# Patient Record
Sex: FEMALE | Race: WHITE | NOT HISPANIC OR LATINO | Employment: UNEMPLOYED | URBAN - METROPOLITAN AREA
[De-identification: names, ages, dates, MRNs, and addresses within clinical notes are randomized per-mention and may not be internally consistent; named-entity substitution may affect disease eponyms.]

---

## 2018-05-14 ENCOUNTER — HOSPITAL ENCOUNTER (INPATIENT)
Facility: MEDICAL CENTER | Age: 33
LOS: 1 days | End: 2018-05-15
Attending: OBSTETRICS & GYNECOLOGY | Admitting: OBSTETRICS & GYNECOLOGY
Payer: COMMERCIAL

## 2018-05-14 LAB
BASOPHILS # BLD AUTO: 0.6 % (ref 0–1.8)
BASOPHILS # BLD: 0.06 K/UL (ref 0–0.12)
COMMENT 1642: NORMAL
EOSINOPHIL # BLD AUTO: 0.06 K/UL (ref 0–0.51)
EOSINOPHIL NFR BLD: 0.6 % (ref 0–6.9)
ERYTHROCYTE [DISTWIDTH] IN BLOOD BY AUTOMATED COUNT: 41.2 FL (ref 35.9–50)
HCT VFR BLD AUTO: 40.6 % (ref 37–47)
HGB BLD-MCNC: 12.2 G/DL (ref 12–16)
HOLDING TUBE BB 8507: NORMAL
IMM GRANULOCYTES # BLD AUTO: 0.1 K/UL (ref 0–0.11)
IMM GRANULOCYTES NFR BLD AUTO: 0.9 % (ref 0–0.9)
LYMPHOCYTES # BLD AUTO: 1.68 K/UL (ref 1–4.8)
LYMPHOCYTES NFR BLD: 15.9 % (ref 22–41)
MCH RBC QN AUTO: 24.6 PG (ref 27–33)
MCHC RBC AUTO-ENTMCNC: 30 G/DL (ref 33.6–35)
MCV RBC AUTO: 82 FL (ref 81.4–97.8)
MONOCYTES # BLD AUTO: 0.73 K/UL (ref 0–0.85)
MONOCYTES NFR BLD AUTO: 6.9 % (ref 0–13.4)
MORPHOLOGY BLD-IMP: NORMAL
NEUTROPHILS # BLD AUTO: 7.92 K/UL (ref 2–7.15)
NEUTROPHILS NFR BLD: 75.1 % (ref 44–72)
NRBC # BLD AUTO: 0 K/UL
NRBC BLD-RTO: 0 /100 WBC
PLATELET # BLD AUTO: 197 K/UL (ref 164–446)
PMV BLD AUTO: 12.1 FL (ref 9–12.9)
RBC # BLD AUTO: 4.95 M/UL (ref 4.2–5.4)
WBC # BLD AUTO: 10.6 K/UL (ref 4.8–10.8)

## 2018-05-14 PROCEDURE — 85025 COMPLETE CBC W/AUTO DIFF WBC: CPT

## 2018-05-14 PROCEDURE — 700102 HCHG RX REV CODE 250 W/ 637 OVERRIDE(OP): Performed by: OBSTETRICS & GYNECOLOGY

## 2018-05-14 PROCEDURE — 770002 HCHG ROOM/CARE - OB PRIVATE (112)

## 2018-05-14 PROCEDURE — 304965 HCHG RECOVERY SERVICES

## 2018-05-14 PROCEDURE — 36415 COLL VENOUS BLD VENIPUNCTURE: CPT

## 2018-05-14 PROCEDURE — 700111 HCHG RX REV CODE 636 W/ 250 OVERRIDE (IP)

## 2018-05-14 PROCEDURE — 59409 OBSTETRICAL CARE: CPT

## 2018-05-14 PROCEDURE — A9270 NON-COVERED ITEM OR SERVICE: HCPCS | Performed by: OBSTETRICS & GYNECOLOGY

## 2018-05-14 RX ORDER — SODIUM CHLORIDE, SODIUM LACTATE, POTASSIUM CHLORIDE, CALCIUM CHLORIDE 600; 310; 30; 20 MG/100ML; MG/100ML; MG/100ML; MG/100ML
INJECTION, SOLUTION INTRAVENOUS PRN
Status: DISCONTINUED | OUTPATIENT
Start: 2018-05-14 | End: 2018-05-15 | Stop reason: HOSPADM

## 2018-05-14 RX ORDER — SODIUM CHLORIDE, SODIUM LACTATE, POTASSIUM CHLORIDE, CALCIUM CHLORIDE 600; 310; 30; 20 MG/100ML; MG/100ML; MG/100ML; MG/100ML
INJECTION, SOLUTION INTRAVENOUS CONTINUOUS
Status: DISCONTINUED | OUTPATIENT
Start: 2018-05-14 | End: 2018-05-14 | Stop reason: HOSPADM

## 2018-05-14 RX ORDER — METHYLERGONOVINE MALEATE 0.2 MG/ML
0.2 INJECTION INTRAVENOUS
Status: DISCONTINUED | OUTPATIENT
Start: 2018-05-14 | End: 2018-05-14 | Stop reason: HOSPADM

## 2018-05-14 RX ORDER — IBUPROFEN 600 MG/1
600 TABLET ORAL EVERY 6 HOURS PRN
Status: DISCONTINUED | OUTPATIENT
Start: 2018-05-14 | End: 2018-05-15 | Stop reason: HOSPADM

## 2018-05-14 RX ORDER — CARBOPROST TROMETHAMINE 250 UG/ML
250 INJECTION, SOLUTION INTRAMUSCULAR
Status: DISCONTINUED | OUTPATIENT
Start: 2018-05-14 | End: 2018-05-15 | Stop reason: HOSPADM

## 2018-05-14 RX ORDER — TERBUTALINE SULFATE 1 MG/ML
0.25 INJECTION, SOLUTION SUBCUTANEOUS PRN
Status: DISCONTINUED | OUTPATIENT
Start: 2018-05-14 | End: 2018-05-14 | Stop reason: HOSPADM

## 2018-05-14 RX ORDER — METHYLERGONOVINE MALEATE 0.2 MG/ML
0.2 INJECTION INTRAVENOUS
Status: DISCONTINUED | OUTPATIENT
Start: 2018-05-14 | End: 2018-05-15 | Stop reason: HOSPADM

## 2018-05-14 RX ORDER — ONDANSETRON 4 MG/1
4 TABLET, ORALLY DISINTEGRATING ORAL EVERY 6 HOURS PRN
Status: DISCONTINUED | OUTPATIENT
Start: 2018-05-14 | End: 2018-05-15 | Stop reason: HOSPADM

## 2018-05-14 RX ORDER — ALUMINA, MAGNESIA, AND SIMETHICONE 2400; 2400; 240 MG/30ML; MG/30ML; MG/30ML
30 SUSPENSION ORAL EVERY 6 HOURS PRN
Status: DISCONTINUED | OUTPATIENT
Start: 2018-05-14 | End: 2018-05-14 | Stop reason: HOSPADM

## 2018-05-14 RX ORDER — IBUPROFEN 600 MG/1
600 TABLET ORAL EVERY 6 HOURS PRN
Status: DISCONTINUED | OUTPATIENT
Start: 2018-05-14 | End: 2018-05-14

## 2018-05-14 RX ORDER — CARBOPROST TROMETHAMINE 250 UG/ML
250 INJECTION, SOLUTION INTRAMUSCULAR
Status: DISCONTINUED | OUTPATIENT
Start: 2018-05-14 | End: 2018-05-14 | Stop reason: HOSPADM

## 2018-05-14 RX ORDER — MISOPROSTOL 200 UG/1
800 TABLET ORAL
Status: DISCONTINUED | OUTPATIENT
Start: 2018-05-14 | End: 2018-05-15 | Stop reason: HOSPADM

## 2018-05-14 RX ORDER — OXYCODONE HYDROCHLORIDE AND ACETAMINOPHEN 5; 325 MG/1; MG/1
1 TABLET ORAL EVERY 4 HOURS PRN
Status: DISCONTINUED | OUTPATIENT
Start: 2018-05-14 | End: 2018-05-14

## 2018-05-14 RX ORDER — MISOPROSTOL 200 UG/1
800 TABLET ORAL
Status: DISCONTINUED | OUTPATIENT
Start: 2018-05-14 | End: 2018-05-14 | Stop reason: HOSPADM

## 2018-05-14 RX ORDER — OXYCODONE AND ACETAMINOPHEN 10; 325 MG/1; MG/1
1 TABLET ORAL EVERY 4 HOURS PRN
Status: DISCONTINUED | OUTPATIENT
Start: 2018-05-14 | End: 2018-05-15 | Stop reason: HOSPADM

## 2018-05-14 RX ORDER — ONDANSETRON 2 MG/ML
4 INJECTION INTRAMUSCULAR; INTRAVENOUS EVERY 6 HOURS PRN
Status: DISCONTINUED | OUTPATIENT
Start: 2018-05-14 | End: 2018-05-15 | Stop reason: HOSPADM

## 2018-05-14 RX ORDER — DOCUSATE SODIUM 100 MG/1
100 CAPSULE, LIQUID FILLED ORAL 2 TIMES DAILY PRN
Status: DISCONTINUED | OUTPATIENT
Start: 2018-05-14 | End: 2018-05-15 | Stop reason: HOSPADM

## 2018-05-14 RX ORDER — HYDROCODONE BITARTRATE AND ACETAMINOPHEN 5; 325 MG/1; MG/1
1-2 TABLET ORAL EVERY 6 HOURS PRN
Status: DISCONTINUED | OUTPATIENT
Start: 2018-05-14 | End: 2018-05-15 | Stop reason: HOSPADM

## 2018-05-14 RX ADMIN — IBUPROFEN 600 MG: 600 TABLET, FILM COATED ORAL at 22:44

## 2018-05-14 RX ADMIN — IBUPROFEN 600 MG: 600 TABLET, FILM COATED ORAL at 15:20

## 2018-05-14 RX ADMIN — IBUPROFEN 600 MG: 600 TABLET, FILM COATED ORAL at 08:20

## 2018-05-14 RX ADMIN — Medication 20 UNITS: at 09:00

## 2018-05-14 ASSESSMENT — PAIN SCALES - GENERAL
PAINLEVEL_OUTOF10: 3
PAINLEVEL_OUTOF10: 1
PAINLEVEL_OUTOF10: 1
PAINLEVEL_OUTOF10: 3
PAINLEVEL_OUTOF10: 1
PAINLEVEL_OUTOF10: 3

## 2018-05-14 ASSESSMENT — PATIENT HEALTH QUESTIONNAIRE - PHQ9
SUM OF ALL RESPONSES TO PHQ9 QUESTIONS 1 AND 2: 0
SUM OF ALL RESPONSES TO PHQ9 QUESTIONS 1 AND 2: 0
1. LITTLE INTEREST OR PLEASURE IN DOING THINGS: NOT AT ALL
1. LITTLE INTEREST OR PLEASURE IN DOING THINGS: NOT AT ALL
2. FEELING DOWN, DEPRESSED, IRRITABLE, OR HOPELESS: NOT AT ALL
2. FEELING DOWN, DEPRESSED, IRRITABLE, OR HOPELESS: NOT AT ALL

## 2018-05-14 ASSESSMENT — LIFESTYLE VARIABLES
EVER_SMOKED: NEVER
ALCOHOL_USE: NO

## 2018-05-14 NOTE — H&P
DATE OF ADMISSION:  2018    ADMITTING DIAGNOSES:  1.  Intrauterine pregnancy at 39-4/7 weeks.  2.  Spontaneous rupture of membranes.  3.  Active labor.    HISTORY OF PRESENT ILLNESS:  This is a 32-year-old  4, para 3 with an   estimated date of confinement of 2018 established by last menstrual   period consistent with a 13-week ultrasound who presents to labor and delivery   with complaints of spontaneous rupture of membranes this morning for clear   fluid.  She underwent a sterile speculum exam, which was positive x3.  She is   4 cm dilated and vivian every 2 minutes.  Recommendation is made for   admission.  The patient agrees and declines anything for pain management at   this time.    PRENATAL CARE:  Has been with Dr. Alvarez.  Her estimated date of   confinement of 2018 was established by last menstrual period consistent   with a 13-week ultrasound.    PRENATAL LABS:  Her blood type is O positive, antibody screen negative, RPR   nonreactive, rubella immune, hepatitis B surface antigen negative, hepatitis C   negative, HIV negative.  Her 1-hour Glucola was normal.  Her Group B strep   status is negative.  She requested only MSAFP, which was low risk.    COMPLICATIONS WITH THE PREGNANCY:  Include anemia.  Total weight gain of   approximately 19 pounds.  She has an anterior placenta with no previa.    PAST MEDICAL HISTORY:  ALLERGIES:  She has no known drug allergies.    MEDICATIONS:  Include prenatal vitamins and iron.    PAST MEDICAL HISTORY:  Negative.    PAST SURGICAL HISTORY:  Negative.    SOCIAL HISTORY:  She denies tobacco, alcohol or IV drug use.    GYNECOLOGIC HISTORY:  She denies any abnormal Paps or HSV.    OBSTETRICAL HISTORY:  She has had 3 normal spontaneous vaginal deliveries, the   largest of which was 8 pounds.    PHYSICAL EXAMINATION:  VITAL SIGNS:  Temperature is 98.1, blood pressure 122/77, pulse is 96.  GENERAL:  She is in moderate distress with  contractions.  LUNGS:  Clear to auscultation bilaterally.  CARDIOVASCULAR:  Regular rate and rhythm.  ABDOMEN:  Gravid and nontender.  EXTREMITIES:  Show +1 pedal edema.  She has no cords or Homans sign.  PELVIC:  Fetal heart tones are in the 130s to 140s and a category 1 tracing.    Tocometry shows contractions every 2-3 minutes.  Cervical exam per the nurse,   she is 4 cm dilated, 70% effaced, -2 station, vertex presentation.  Estimated   fetal weight is 3500 g.    IMPRESSION:  This is a 32-year-old  4, para 3 at 39-4/7 weeks with   spontaneous rupture of membranes in active labor, is doing well.    PLAN:  1.  Patient will be admitted to labor and delivery.  2.  IV fluids will be started.  3.  There is currently reassuring fetal surveillance.       ____________________________________     MD JOSÉ MIGUEL MIDDLETON / IFRAH    DD:  2018 05:24:27  DT:  2018 05:41:18    D#:  [id number]  Job#:  [id number]    cc: MEHDI KEARNEY MD

## 2018-05-14 NOTE — PROGRESS NOTES
Patient brought from labor and delivery via wheelchair.  Patient oriented to room and surroundings. Id bands and security issues discussed. Including not letting anyone take infant without pink photo id. No sleeping with infant, no carrying infant in halls, and no leaving infant unattended. 0-10 pain scale and pain management discussed. Fundus firm with light lochia. IV infusing without redness or swelling.  Family at bedside.  Patient encouraged to call before getting out of bed until stable.  Patient encouraged to call for any needs. Skylight discussed. Cuddles activated. ID bands checked

## 2018-05-14 NOTE — PROGRESS NOTES
32 y.o. , EDC  (39.4)    Pt presents to L&D c/o UCs since about 0100 that are 3-5 min apart and SROM around 0440. Pt is grossly ruptured w/ clear fluid. SVE=4/-2.   0515-Dr Reyna updated. Order to admit pt. Pt wanting to labor naturally, ok with saline lock.   0615-SVE by MD=8cm.   0655-report given to STEPAN Philippe

## 2018-05-14 NOTE — PROGRESS NOTES
0655; Report received from Wanda AGUILAR. Pt called out to say needs to push; Dr. Reyna called; SVE 9 cm  0700: Dr. Iraheta present for  of viable male at 0704; 8/9 APGAR. Mother refusing pitocin and bleeding is light and fundus firm  0815; Assisted to bathroom to void; bleeding is light to moderate with no clots; educated about the importance of voiding frequently.   0900: Transferred to [facility] via w/c. Pt had blood running down her leg so assisted to bathroom to void again; fundal rub produced golf-ball sized clot with light bleeding following clot. Encouraged to allow us to start pitocin and educated her why; agreeing to pitocin; pitocin started. Report given to Clyde AGUILAR

## 2018-05-14 NOTE — PROGRESS NOTES
S:  Pt having contractions closer and stronger.  REquesting to be examined    O:  VSS - afebrile  FHTs 140s Cat 1  [place]: Q 2  Cx: 8/100/0    A/P:  IUP at 39 4/7 weeks, SROM active labor - doing well  1. Labor: Progressing well  2.  FWB: Reassuirng

## 2018-05-14 NOTE — DELIVERY NOTE
DATE OF SERVICE:  2018    DELIVERY NOTE:  This is a 32-year-old  4, para 3 who presented to labor   and delivery this morning with complaints of spontaneous rupture of membranes   for clear fluid at 0445 hours.  Sterile speculum exam was positive x3.  She   was 4 cm dilated and vivian.  She was admitted to labor and delivery and   quickly progressed to complete.  She pushed 3 times to deliver the head over   an intact perineum.  Mouth and nose were bulb suctioned and there was no   evidence of nuchal cord.  The rest of the baby delivered easily from the   occiput anterior position at 0704 hours.  This is a viable male infant, weight   pending, Apgars 8 and 9.  The infant was placed on the mother's abdomen   crying vigorously.  There was delayed cord clamping for 1 minute.  The cord   was then clamped twice and cut.  The placenta delivered spontaneously intact   with a 3-vessel cord at 0707 hours.  Fundal massage and bimanual massage   provided good uterine contraction.  Estimated blood loss was 100 mL.    Inspection of the cervix revealed no lacerations.  Inspection of the perineum   revealed no lacerations.  Mother and infant are stable.       ____________________________________     MD JOSÉ MIGUEL MIDDLETON / IFRAH    DD:  2018 07:17:10  DT:  2018 07:26:31    D#:  [id number]  Job#:  [id number]    cc: MEHDI KEARNEY MD

## 2018-05-15 VITALS
RESPIRATION RATE: 20 BRPM | OXYGEN SATURATION: 97 % | TEMPERATURE: 98 F | BODY MASS INDEX: 25.9 KG/M2 | HEART RATE: 104 BPM | HEIGHT: 67 IN | SYSTOLIC BLOOD PRESSURE: 112 MMHG | WEIGHT: 165 LBS | DIASTOLIC BLOOD PRESSURE: 65 MMHG

## 2018-05-15 LAB
ERYTHROCYTE [DISTWIDTH] IN BLOOD BY AUTOMATED COUNT: 39.4 FL (ref 35.9–50)
HCT VFR BLD AUTO: 31.9 % (ref 37–47)
HGB BLD-MCNC: 10.1 G/DL (ref 12–16)
MCH RBC QN AUTO: 25 PG (ref 27–33)
MCHC RBC AUTO-ENTMCNC: 31.7 G/DL (ref 33.6–35)
MCV RBC AUTO: 79 FL (ref 81.4–97.8)
PLATELET # BLD AUTO: 278 K/UL (ref 164–446)
PMV BLD AUTO: 10.3 FL (ref 9–12.9)
RBC # BLD AUTO: 4.04 M/UL (ref 4.2–5.4)
WBC # BLD AUTO: 13.5 K/UL (ref 4.8–10.8)

## 2018-05-15 PROCEDURE — 36415 COLL VENOUS BLD VENIPUNCTURE: CPT

## 2018-05-15 PROCEDURE — 700102 HCHG RX REV CODE 250 W/ 637 OVERRIDE(OP): Performed by: OBSTETRICS & GYNECOLOGY

## 2018-05-15 PROCEDURE — A9270 NON-COVERED ITEM OR SERVICE: HCPCS | Performed by: OBSTETRICS & GYNECOLOGY

## 2018-05-15 PROCEDURE — 85027 COMPLETE CBC AUTOMATED: CPT

## 2018-05-15 RX ORDER — PSEUDOEPHEDRINE HCL 30 MG
100 TABLET ORAL 2 TIMES DAILY PRN
Qty: 60 CAP | COMMUNITY
Start: 2018-05-15

## 2018-05-15 RX ORDER — IBUPROFEN 600 MG/1
600 TABLET ORAL EVERY 6 HOURS PRN
Qty: 20 TAB | Refills: 1 | Status: SHIPPED | OUTPATIENT
Start: 2018-05-15

## 2018-05-15 RX ADMIN — IBUPROFEN 600 MG: 600 TABLET, FILM COATED ORAL at 05:20

## 2018-05-15 RX ADMIN — IBUPROFEN 600 MG: 600 TABLET, FILM COATED ORAL at 12:28

## 2018-05-15 ASSESSMENT — PATIENT HEALTH QUESTIONNAIRE - PHQ9
SUM OF ALL RESPONSES TO PHQ9 QUESTIONS 1 AND 2: 0
2. FEELING DOWN, DEPRESSED, IRRITABLE, OR HOPELESS: NOT AT ALL
1. LITTLE INTEREST OR PLEASURE IN DOING THINGS: NOT AT ALL

## 2018-05-15 ASSESSMENT — PAIN SCALES - GENERAL
PAINLEVEL_OUTOF10: 4
PAINLEVEL_OUTOF10: 2
PAINLEVEL_OUTOF10: 3
PAINLEVEL_OUTOF10: 2

## 2018-05-15 NOTE — PROGRESS NOTES
A&Ox4, pleasant. Denies pain. Fundus firm, lochia light. Breastfeeding going well. No issues per pt. All needs met at this time per pt. VSS. Up self, tolerates well.

## 2018-05-15 NOTE — PROGRESS NOTES
Assessment complete. VSS- Fundus firm, lochia light. Patient ambulating and voiding without difficulty. Pt requesting IV to be removed. Educated pt on importance of repeat CBC resulting before removal, but pt would like IV removed now. IV removed, tip intact. POC discussed at bedside. Feeding plan discussed; helped infant to latch and pt will call for next feed. Patient would like to call if she would like pain medications throughout the night. Call light within reach.

## 2018-05-15 NOTE — PROGRESS NOTES
POSTPARTUM DAY 1    Afeb, VS nl    No complaints, wants to go home, instructions given.    LAB:     Results for HARLEY ANGUIANO (MRN [id number]) as of 5/15/2018 06:30   5/14/2018 05:27 5/15/2018 03:43   WBC 10.6 13.5 (H)   Hemoglobin 12.2 10.1 (L)   Hematocrit 40.6 31.9 (L)   Platelet Count 197 278       PE:  Ut NT, calves NT    PLAN:  Discharge  home, followup 6 weeks, Rx PNV, ibuprofen 600 mg PRN.

## 2018-05-15 NOTE — DISCHARGE INSTRUCTIONS
POSTPARTUM DISCHARGE INSTRUCTIONS FOR MOM    YOB: 1985   Age: 32 y.o.               Admit Date: 5/14/2018     Discharge Date: 5/15/2018  Attending Doctor:  Joseph Alvarez M.D.                  Allergies:  Patient has no known allergies.    Discharged to home by car. Discharged via wheelchair, hospital escort: Yes.  Special equipment needed: Not Applicable  Belongings with: Personal  Be sure to schedule a follow-up appointment with your primary care doctor or any specialists as instructed.     Discharge Plan:   Diet Plan: Discussed  Activity Level: Discussed  Confirmed Follow up Appointment: Patient to Call and Schedule Appointment  Confirmed Symptoms Management: Discussed  Medication Reconciliation Updated: Yes  Influenza Vaccine Indication: Patient Refuses    REASONS TO CALL YOUR OBSTETRICIAN:  1.   Persistent fever or shaking chills (Temperature higher than 100.4)  2.   Heavy bleeding (soaking more than 1 pad per hour); Passing clots  3.   Foul odor from vagina  4.   Mastitis (Breast infection; breast pain, chills, fever, redness)  5.   Urinary pain, burning or frequency  6.   Episiotomy infection  7.   Severe depression longer than 24 hours    HAND WASHING  · Prior to handling the baby.  · Before breastfeeding or bottle feeding baby.  · After using the bathroom or changing the baby's diaper.    VAGINAL CARE  · Nothing inside vagina for 6 weeks: no sexual intercourse, tampons or douching.  · Bleeding may continue for 2-4 weeks.  Amount may vary.    · Call your physician for heavy bleeding which means soaking more than 1 pad per hour    BIRTH CONTROL  · It is possible to become pregnant at any time after delivery and while breastfeeding.  · Plan to discuss a method of birth control with your physician at your follow up visit. visit.    DIET AND ELIMINATION  · Eating more fiber (bran cereal, fruits, and vegetables) and drinking plenty of fluids will help to avoid constipation.  · Urinary frequency  "after childbirth is normal.    POSTPARTUM BLUES  During the first few days after birth, you may experience a sense of the \"blues\" which may include impatience, irritability or even crying.  These feeling come and go quickly.  However, as many as 1 in 10 women experience emotional symptoms known as postpartum depression.    Postpartum depression:  May start as early as the second or third day after delivery or take several weeks or months to develop.  Symptoms of \"blues\" are present, but are more intense:  Crying spells; loss of appetite; feelings of hopelessness or loss of control; fear of touching the baby; over concern or no concern at all about the baby; little or no concern about your own appearance/caring for yourself; and/or inability to sleep or excessive sleeping.  Contact your physician if you are experiencing any of these symptoms.    Crisis Hotline:  · [place] Crisis Hotline:  6-307-GCJOHVC  Or 1-339.250.8607  · [place] Crisis Hotline:  1-288.504.9726  Or 137-194-6838    PREVENTING SHAKEN BABY:  If you are angry or stressed, PUT THE BABY IN THE CRIB, step away, take some deep breaths, and wait until you are calm to care for the baby.  DO NOT SHAKE THE BABY.  You are not alone, call a supporter for help.    · Crisis Call Center 24/7 crisis line 805-247-4242 or 1-455.972.4874  · You can also text them, text \"ANSWER\" to 351801    QUIT SMOKING/TOBACCO USE:  I understand the use of any tobacco products increases my chance of suffering from future heart disease and could cause other illnesses which may shorten my life. Quitting the use of tobacco products is the single most important thing I can do to improve my health. For further information on smoking / tobacco cessation call a Toll Free Quit Line at 1-246.257.6696 (*[employer] [place]) or 1-890.656.1211 (American Lung Association) or you can access the web based program at www.lungusa.org.    · [place] Tobacco Users Help Line:  (351) 464-3305       " Toll Free: 0-163-394-2128  · Quit Tobacco Program [facility] (935)398-8881    DEPRESSION / SUICIDE RISK:  As you are discharged from this [facility], it is important to learn how to keep safe from harming yourself.    Recognize the warning signs:  · Abrupt changes in personality, positive or negative- including increase in energy   · Giving away possessions  · Change in eating patterns- significant weight changes-  positive or negative  · Change in sleeping patterns- unable to sleep or sleeping all the time   · Unwillingness or inability to communicate  · Depression  · Unusual sadness, discouragement and loneliness  · Talk of wanting to die  · Neglect of personal appearance   · Rebelliousness- reckless behavior  · Withdrawal from people/activities they love  · Confusion- inability to concentrate     If you or a loved one observes any of these behaviors or has concerns about self-harm, here's what you can do:  · Talk about it- your feelings and reasons for harming yourself  · Remove any means that you might use to hurt yourself (examples: pills, rope, extension cords, firearm)  · Get professional help from the community (Mental Health, Substance Abuse, psychological counseling)  · Do not be alone:Call your Safe Contact- someone whom you trust who will be there for you.  · Call your local CRISIS HOTLINE 275-9307 or 431-272-8001  · Call your local Children's Mobile Crisis Response Team [place] (614) 044-5454 or www.[employer]  · Call the toll free National Suicide Prevention Hotlines   · National Suicide Prevention Lifeline 588-565-CSRJ (8102)  · National Hope Line Network 800-SUICIDE (802-9760)    DISCHARGE SURVEY:  Thank you for choosing [facility].  We hope we provided you with very good care.  You may be receiving a survey in the mail.  Please fill it out.  Your opinion is valuable to us.    ADDITIONAL EDUCATIONAL MATERIALS GIVEN TO PATIENT:        My signature on  this form indicates that:  1.  I have reviewed and understand the above information  2.  My questions regarding this information have been answered to my satisfaction.  3.  I have formulated a plan with my discharge nurse to obtain my prescribed medication for home.

## 2018-05-15 NOTE — PROGRESS NOTES
Discharge order received. No PIV  Printed discharge instructions and prescriptions given to pt. All discharge education complete, specifically need to f/u with OBGYN in 6 wks and come back through ED for s/s of infection/hemmorhage, all questions and concerns were addressed. VSS. Labs stable. Staff transported pt down to private vehicle.

## 2019-02-22 ENCOUNTER — HOSPITAL ENCOUNTER (OUTPATIENT)
Dept: LAB | Facility: MEDICAL CENTER | Age: 34
End: 2019-02-22
Attending: PHYSICIAN ASSISTANT
Payer: COMMERCIAL

## 2019-02-22 PROCEDURE — 87591 N.GONORRHOEAE DNA AMP PROB: CPT

## 2019-02-22 PROCEDURE — 87491 CHLMYD TRACH DNA AMP PROBE: CPT

## 2019-02-23 LAB
C TRACH DNA SPEC QL NAA+PROBE: NEGATIVE
N GONORRHOEA DNA SPEC QL NAA+PROBE: NEGATIVE
SPECIMEN SOURCE: NORMAL